# Patient Record
Sex: FEMALE | Employment: FULL TIME | URBAN - METROPOLITAN AREA
[De-identification: names, ages, dates, MRNs, and addresses within clinical notes are randomized per-mention and may not be internally consistent; named-entity substitution may affect disease eponyms.]

---

## 2020-05-20 ENCOUNTER — APPOINTMENT (OUTPATIENT)
Dept: URGENT CARE | Facility: URGENT CARE | Age: 20
End: 2020-05-20

## 2020-05-20 ENCOUNTER — RESULTS ONLY (OUTPATIENT)
Dept: LAB | Age: 20
End: 2020-05-20

## 2020-05-21 ENCOUNTER — APPOINTMENT (OUTPATIENT)
Dept: URGENT CARE | Facility: URGENT CARE | Age: 20
End: 2020-05-21

## 2020-05-21 ENCOUNTER — RESULTS ONLY (OUTPATIENT)
Dept: LAB | Age: 20
End: 2020-05-21

## 2020-05-21 LAB
SARS-COV-2 RNA SPEC QL NAA+PROBE: NOT DETECTED
SPECIMEN SOURCE: NORMAL

## 2020-05-22 LAB
SARS-COV-2 RNA SPEC QL NAA+PROBE: NOT DETECTED
SPECIMEN SOURCE: NORMAL

## 2021-07-31 PROCEDURE — U0003 INFECTIOUS AGENT DETECTION BY NUCLEIC ACID (DNA OR RNA); SEVERE ACUTE RESPIRATORY SYNDROME CORONAVIRUS 2 (SARS-COV-2) (CORONAVIRUS DISEASE [COVID-19]), AMPLIFIED PROBE TECHNIQUE, MAKING USE OF HIGH THROUGHPUT TECHNOLOGIES AS DESCRIBED BY CMS-2020-01-R: HCPCS | Performed by: INTERNAL MEDICINE

## 2021-08-01 ENCOUNTER — LAB REQUISITION (OUTPATIENT)
Dept: LAB | Facility: CLINIC | Age: 21
End: 2021-08-01

## 2021-08-02 LAB — SARS-COV-2 RNA RESP QL NAA+PROBE: NEGATIVE

## 2021-08-11 ENCOUNTER — LAB REQUISITION (OUTPATIENT)
Dept: LAB | Facility: CLINIC | Age: 21
End: 2021-08-11

## 2021-08-11 PROCEDURE — U0005 INFEC AGEN DETEC AMPLI PROBE: HCPCS | Performed by: INTERNAL MEDICINE

## 2021-08-12 LAB — SARS-COV-2 RNA RESP QL NAA+PROBE: NEGATIVE

## 2021-11-25 ENCOUNTER — OFFICE VISIT (OUTPATIENT)
Dept: URGENT CARE | Facility: URGENT CARE | Age: 21
End: 2021-11-25
Payer: COMMERCIAL

## 2021-11-25 VITALS
WEIGHT: 120 LBS | HEART RATE: 91 BPM | OXYGEN SATURATION: 100 % | RESPIRATION RATE: 18 BRPM | SYSTOLIC BLOOD PRESSURE: 130 MMHG | TEMPERATURE: 99.1 F | DIASTOLIC BLOOD PRESSURE: 81 MMHG

## 2021-11-25 DIAGNOSIS — N89.8 VAGINAL IRRITATION: Primary | ICD-10-CM

## 2021-11-25 LAB
CLUE CELLS: ABNORMAL
TRICHOMONAS, WET PREP: ABNORMAL
WBC'S/HIGH POWER FIELD, WET PREP: ABNORMAL
YEAST, WET PREP: ABNORMAL

## 2021-11-25 PROCEDURE — 99203 OFFICE O/P NEW LOW 30 MIN: CPT | Performed by: NURSE PRACTITIONER

## 2021-11-25 PROCEDURE — 87210 SMEAR WET MOUNT SALINE/INK: CPT | Performed by: NURSE PRACTITIONER

## 2021-11-25 RX ORDER — LEVONORGESTREL/ETHIN.ESTRADIOL 0.1-0.02MG
1 TABLET ORAL DAILY
COMMUNITY
Start: 2021-10-14

## 2021-11-25 RX ORDER — AMOXICILLIN 500 MG/1
CAPSULE ORAL
COMMUNITY
Start: 2021-10-01

## 2021-11-25 RX ORDER — IBUPROFEN 600 MG/1
TABLET, FILM COATED ORAL
COMMUNITY
Start: 2021-10-01

## 2021-11-25 RX ORDER — MV-MINS NO.50/IRON,CARB/FOLIC 29 MG-1 MG
1 TABLET ORAL DAILY
COMMUNITY
Start: 2021-10-14

## 2021-11-25 NOTE — PROGRESS NOTES
Chief Complaint   Patient presents with     Urgent Care     vaginal redness     SUBJECTIVE:  Tay Bingham is a 21 year old female presenting with vaginal redness for 1 day. She is using a new soap. Declines any concerns for STDs or urinary tract infection. No dysuria, flank pain, discharge, itch, lesions.    No past medical history on file.  cholecalciferol 25 MCG (1000 UT) TABS, Take 25 mcg by mouth  levonorgestrel-ethinyl estradiol (AVIANE) 0.1-20 MG-MCG tablet, Take 1 tablet by mouth daily  Prenatal Vit-Iron Carbonyl-FA (VOL-TAB RX) 29-1 MG TABS, Take 1 tablet by mouth daily  acetaminophen-codeine (TYLENOL #3) 300-30 MG tablet, TAKE 1-2 TABLETS BY MOUTH EVERY 6 HOURS AS NEEDED FOR PAIN. MAY TAKE WITH IBUPROFEN  amoxicillin (AMOXIL) 500 MG capsule, TAKE ONE CAPSULE BY MOUTH THREE TIMES DAILY UNTIL ALL TAKEN  ibuprofen (ADVIL/MOTRIN) 600 MG tablet, TAKE 1 TABLET BY MOUTH EVERY 6-8 HOURS AS NEEDED FOR PAIN    No current facility-administered medications on file prior to visit.    Social History     Tobacco Use     Smoking status: Never Smoker     Smokeless tobacco: Never Used   Substance Use Topics     Alcohol use: Not on file     No Known Allergies    Review of Systems   All systems negative except for those listed above in HPI.    OBJECTIVE:   /81   Pulse 91   Temp 99.1  F (37.3  C)   Resp 18   Wt 54.4 kg (120 lb)   SpO2 100%      Physical Exam  Vitals reviewed.   Constitutional:       Appearance: Normal appearance.   HENT:      Head: Normocephalic and atraumatic.   Cardiovascular:      Rate and Rhythm: Normal rate.   Pulmonary:      Effort: Pulmonary effort is normal.   Abdominal:      Tenderness: There is no right CVA tenderness or left CVA tenderness.   Musculoskeletal:         General: Normal range of motion.   Skin:     General: Skin is warm and dry.      Findings: No rash.   Neurological:      General: No focal deficit present.      Mental Status: She is alert and oriented to person, place, and  time.   Psychiatric:         Mood and Affect: Mood normal.         Behavior: Behavior normal.       Results for orders placed or performed in visit on 11/25/21   Wet prep - Clinic Collect     Status: Abnormal    Specimen: Vagina; Swab   Result Value Ref Range    Trichomonas Absent Absent    Yeast Absent Absent    Clue Cells Absent Absent    WBCs/high power field 3+ (A) None     ASSESSMENT:    ICD-10-CM    1. Vaginal irritation  N89.8 Wet prep - Clinic Collect     PLAN:     Wet prep normal and negative for BV, yeast, trich  Likely a contact irritant vaginitis from new soap  Bathe in mostly just water, okay to use fly soap or other hypoallergenic soap    Follow up with primary care provider with any problems, questions or concerns or if symptoms worsen or fail to improve. Patient agreed to plan and verbalized understanding.    RONIT Ruiz-[facility], [place]

## 2025-02-03 ENCOUNTER — ALLIED HEALTH/NURSE VISIT (OUTPATIENT)
Dept: FAMILY MEDICINE | Facility: CLINIC | Age: 25
End: 2025-02-03

## 2025-02-03 DIAGNOSIS — Z23 ENCOUNTER FOR IMMUNIZATION: Primary | ICD-10-CM

## 2025-02-03 PROCEDURE — 86580 TB INTRADERMAL TEST: CPT

## 2025-02-03 PROCEDURE — 99207 PR NO CHARGE NURSE ONLY: CPT

## 2025-02-03 NOTE — PROGRESS NOTES
Prior to immunization administration, verified patients identity using patient s name and date of birth. Please see Immunization Activity for additional information.     Is the patient's temperature normal (100.5 or less)? Yes     Patient MEETS CRITERIA. PROCEED with vaccine administration.      Patient instructed to remain in clinic for 15 minutes afterwards, and to report any adverse reactions.      Link to Ancillary Visit Immunization Standing Orders SmartSet     Screening performed by Anupam Gonzalez CMA on 2/3/2025 at 1:06 PM.      Patient is here today for a Mantoux (TST) test placement.    Is there a current order in the chart? Yes    Reason for Mantoux (TST) in patient's own words: forms    Patient needs form signed? Will complete at RN read    Instructed patient to wait for 15 minutes post injection and to report any reactions immediately to staff.    Told patient to return to clinic in 48-72 hours to have Mantoux (TST) read.       Anupam Gonzalez CMA on 2/3/2025 at 1:14 PM

## 2025-02-05 ENCOUNTER — ALLIED HEALTH/NURSE VISIT (OUTPATIENT)
Dept: NURSING | Facility: CLINIC | Age: 25
End: 2025-02-05

## 2025-02-05 DIAGNOSIS — Z11.1 SCREENING EXAMINATION FOR PULMONARY TUBERCULOSIS: Primary | ICD-10-CM

## 2025-02-05 LAB
PPDINDURATION: 0 MM (ref 0–4.99)
PPDREDNESS: NORMAL

## 2025-02-05 PROCEDURE — 99207 PR NO CHARGE NURSE ONLY: CPT

## 2025-02-05 NOTE — NURSING NOTE
Patient is here today for a Mantoux (TST) test results.    Did patient return to clinic 48-72 hours from Mantoux (TST) placement: Yes -     PPD Induration   Date Value Ref Range Status   02/05/2025 0 0 - 4.99 mm Final     Comment:     negative     PPD Redness   Date Value Ref Range Status   02/05/2025 Not Present  Final     Comment:     negative           Induration Size? Induration <5mm - Enter results in Enter/Edit Activity. Route results to ordering provider.     Patient needs form signed? Yes. Follow clinic form process.     Patient reports having previously had the BCG Vaccine: No    Does patient need a two step? No

## 2025-03-03 ENCOUNTER — ALLIED HEALTH/NURSE VISIT (OUTPATIENT)
Dept: FAMILY MEDICINE | Facility: CLINIC | Age: 25
End: 2025-03-03
Payer: COMMERCIAL

## 2025-03-03 DIAGNOSIS — Z11.1 SCREENING EXAMINATION FOR PULMONARY TUBERCULOSIS: Primary | ICD-10-CM

## 2025-03-03 PROCEDURE — 99207 PR NO CHARGE NURSE ONLY: CPT

## 2025-03-03 PROCEDURE — 86580 TB INTRADERMAL TEST: CPT

## 2025-03-03 NOTE — PROGRESS NOTES
"Patient is here today for a Mantoux (TST) test placement.    Is there a current order in the chart? No. Placed order according to standing order (reference the \"Skin Test- Tuberculosis Screening- Ambulatory Care\" standing order in [employer]). Review the Inclusion and Exclusion Criteria.        Inclusion Criteria  Pre-employment screening for healthcare workers and correctional facility staff - Administer two-step TST. Patient to return for second test in 1-3 weeks after first test is read.     Exclusion Criteria  None - Place order for Mantoux (TST) test per standing order.    Reason for Mantoux (TST) in patient's own words: Patient works at [employer]    Patient needs form signed? Yes- completed per clinic's forms process.    Instructed patient to wait for 15 minutes post injection and to report any reactions immediately to staff.    Told patient to return to clinic in 48-72 hours to have Mantoux (TST) read.     Phillip Contreras CMA on 3/3/2025 at 11:43 AM        "

## 2025-03-05 ENCOUNTER — ALLIED HEALTH/NURSE VISIT (OUTPATIENT)
Dept: NURSING | Facility: CLINIC | Age: 25
End: 2025-03-05
Payer: COMMERCIAL

## 2025-03-05 DIAGNOSIS — Z11.1 SCREENING EXAMINATION FOR PULMONARY TUBERCULOSIS: Primary | ICD-10-CM

## 2025-03-05 LAB
PPDINDURATION: 0 MM (ref 0–4.99)
PPDREDNESS: NORMAL

## 2025-03-05 PROCEDURE — 99207 PR NO CHARGE NURSE ONLY: CPT

## 2025-03-05 NOTE — PROGRESS NOTES
Patient is here today for a Mantoux (TST) test results.    Did patient return to clinic 48-72 hours from Mantoux (TST) placement: Yes -     PPD Induration   Date Value Ref Range Status   03/05/2025 0 0 - 4.99 mm Final     PPD Redness   Date Value Ref Range Status   03/05/2025 Not Present  Final       Induration Size? Induration <5mm - Enter results in Enter/Edit Activity. Route results to ordering provider.     Patient needs form signed? Yes. Follow clinic form process.